# Patient Record
Sex: MALE | Race: OTHER | NOT HISPANIC OR LATINO | URBAN - METROPOLITAN AREA
[De-identification: names, ages, dates, MRNs, and addresses within clinical notes are randomized per-mention and may not be internally consistent; named-entity substitution may affect disease eponyms.]

---

## 2017-10-26 ENCOUNTER — EMERGENCY (EMERGENCY)
Facility: HOSPITAL | Age: 27
LOS: 1 days | Discharge: HOME | End: 2017-10-26
Attending: EMERGENCY MEDICINE
Payer: COMMERCIAL

## 2017-10-26 VITALS
SYSTOLIC BLOOD PRESSURE: 120 MMHG | DIASTOLIC BLOOD PRESSURE: 74 MMHG | RESPIRATION RATE: 16 BRPM | TEMPERATURE: 98 F | HEART RATE: 88 BPM | HEIGHT: 78 IN | WEIGHT: 225.09 LBS | OXYGEN SATURATION: 99 %

## 2017-10-26 DIAGNOSIS — Z98.89 OTHER SPECIFIED POSTPROCEDURAL STATES: Chronic | ICD-10-CM

## 2017-10-26 PROCEDURE — 73130 X-RAY EXAM OF HAND: CPT | Mod: 26,RT

## 2017-10-26 PROCEDURE — 99283 EMERGENCY DEPT VISIT LOW MDM: CPT | Mod: 25

## 2017-10-26 PROCEDURE — 73130 X-RAY EXAM OF HAND: CPT

## 2017-10-26 PROCEDURE — 99283 EMERGENCY DEPT VISIT LOW MDM: CPT

## 2017-10-26 RX ORDER — IBUPROFEN 200 MG
600 TABLET ORAL ONCE
Qty: 0 | Refills: 0 | Status: COMPLETED | OUTPATIENT
Start: 2017-10-26 | End: 2017-10-26

## 2017-10-26 RX ADMIN — Medication 600 MILLIGRAM(S): at 16:29
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
## 2017-10-26 NOTE — ED PROVIDER NOTE - PROGRESS NOTE DETAILS
Pt treated with pain medication and X-ray ordered. Pt treated with pain medication and X-ray ordered. No acute fracture/Normal imaging on ED read. Official Read and report pending Radiologist review in the Morning. Patient will be contacted if any additional findings  are made on official read.

## 2017-10-26 NOTE — ED PROVIDER NOTE - CARE PLAN
Principal Discharge DX:	Hand pain, right  Instructions for follow-up, activity and diet:	Continue with OTC pain medication and F/u with Department Physician

## 2017-10-26 NOTE — ED PROVIDER NOTE - PHYSICAL EXAMINATION
Right hand examination . No ecchymosis or contusion. Tenderness on palpation of hand on dorsal aspect. capillary refill <2sec.
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
## 2020-07-19 ENCOUNTER — EMERGENCY (EMERGENCY)
Facility: HOSPITAL | Age: 30
LOS: 1 days | Discharge: HOME | End: 2020-07-19
Attending: EMERGENCY MEDICINE
Payer: COMMERCIAL

## 2020-07-19 VITALS
OXYGEN SATURATION: 99 % | HEIGHT: 78 IN | RESPIRATION RATE: 20 BRPM | DIASTOLIC BLOOD PRESSURE: 95 MMHG | WEIGHT: 220.02 LBS | HEART RATE: 80 BPM | TEMPERATURE: 98 F | SYSTOLIC BLOOD PRESSURE: 150 MMHG

## 2020-07-19 DIAGNOSIS — Z98.89 OTHER SPECIFIED POSTPROCEDURAL STATES: Chronic | ICD-10-CM

## 2020-07-19 PROCEDURE — 96372 THER/PROPH/DIAG INJ SC/IM: CPT

## 2020-07-19 PROCEDURE — 99284 EMERGENCY DEPT VISIT MOD MDM: CPT

## 2020-07-19 PROCEDURE — 99053 MED SERV 10PM-8AM 24 HR FAC: CPT

## 2020-07-19 PROCEDURE — 99284 EMERGENCY DEPT VISIT MOD MDM: CPT | Mod: 25

## 2020-07-19 RX ORDER — METHOCARBAMOL 500 MG/1
1500 TABLET, FILM COATED ORAL ONCE
Refills: 0 | Status: COMPLETED | OUTPATIENT
Start: 2020-07-19 | End: 2020-07-19

## 2020-07-19 RX ORDER — LIDOCAINE 4 G/100G
1 CREAM TOPICAL ONCE
Refills: 0 | Status: COMPLETED | OUTPATIENT
Start: 2020-07-19 | End: 2020-07-19

## 2020-07-19 RX ORDER — IBUPROFEN 200 MG
1 TABLET ORAL
Qty: 30 | Refills: 0
Start: 2020-07-19 | End: 2020-07-28

## 2020-07-19 RX ORDER — METHOCARBAMOL 500 MG/1
2 TABLET, FILM COATED ORAL
Qty: 30 | Refills: 0
Start: 2020-07-19 | End: 2020-07-23

## 2020-07-19 RX ORDER — KETOROLAC TROMETHAMINE 30 MG/ML
30 SYRINGE (ML) INJECTION ONCE
Refills: 0 | Status: DISCONTINUED | OUTPATIENT
Start: 2020-07-19 | End: 2020-07-19

## 2020-07-19 RX ORDER — LIDOCAINE 4 G/100G
1 CREAM TOPICAL
Qty: 7 | Refills: 0
Start: 2020-07-19 | End: 2020-07-25

## 2020-07-19 RX ADMIN — METHOCARBAMOL 1500 MILLIGRAM(S): 500 TABLET, FILM COATED ORAL at 04:13

## 2020-07-19 RX ADMIN — Medication 30 MILLIGRAM(S): at 04:11

## 2020-07-19 RX ADMIN — LIDOCAINE 1 PATCH: 4 CREAM TOPICAL at 04:13

## 2020-07-19 NOTE — ED PROVIDER NOTE - MUSCULOSKELETAL, MLM
Spine appears normal, no midline pt vertebral or step offs. 5/5 muscle strength lower extremities range of motion is not limited. sensation intact . ambulatory steady gait
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
## 2020-07-19 NOTE — ED PROVIDER NOTE - PATIENT PORTAL LINK FT
You can access the FollowMyHealth Patient Portal offered by [facility] by registering at the following website: [email or web address]. By joining [facility]’s FollowMyHealth portal, you will also be able to view your health information using other applications (apps) compatible with our system.
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
## 2021-08-23 ENCOUNTER — EMERGENCY (EMERGENCY)
Facility: HOSPITAL | Age: 31
LOS: 1 days | Discharge: HOME | End: 2021-08-23
Attending: EMERGENCY MEDICINE
Payer: COMMERCIAL

## 2021-08-23 VITALS
SYSTOLIC BLOOD PRESSURE: 131 MMHG | HEIGHT: 78 IN | RESPIRATION RATE: 18 BRPM | OXYGEN SATURATION: 95 % | DIASTOLIC BLOOD PRESSURE: 81 MMHG | HEART RATE: 87 BPM | TEMPERATURE: 98 F

## 2021-08-23 VITALS
SYSTOLIC BLOOD PRESSURE: 129 MMHG | DIASTOLIC BLOOD PRESSURE: 79 MMHG | OXYGEN SATURATION: 99 % | TEMPERATURE: 97 F | HEART RATE: 81 BPM | RESPIRATION RATE: 19 BRPM

## 2021-08-23 DIAGNOSIS — Z98.89 OTHER SPECIFIED POSTPROCEDURAL STATES: Chronic | ICD-10-CM

## 2021-08-23 LAB
BASE EXCESS BLDV CALC-SCNC: 1.6 MMOL/L — SIGNIFICANT CHANGE UP (ref -2–3)
BLOOD GAS SOURCE: SIGNIFICANT CHANGE UP
CA-I SERPL-SCNC: 1.05 MMOL/L — LOW (ref 1.15–1.33)
CHLORIDE BLDV-SCNC: 103 MMOL/L — SIGNIFICANT CHANGE UP (ref 98–107)
COHGB MFR BLDV: 5.4 % — HIGH
GAS PNL BLDV: 134 MMOL/L — LOW (ref 136–145)
GAS PNL BLDV: SIGNIFICANT CHANGE UP
GLUCOSE BLDV-MCNC: 91 MG/DL — SIGNIFICANT CHANGE UP (ref 70–99)
HCO3 BLDV-SCNC: 26 MMOL/L — SIGNIFICANT CHANGE UP (ref 22–29)
HCT VFR BLDA CALC: 52 % — HIGH (ref 39–51)
HGB BLD CALC-MCNC: 17.2 G/DL — SIGNIFICANT CHANGE UP (ref 12.6–17.4)
HGB BLD CALC-MCNC: 17.5 G/DL — HIGH (ref 12.6–17.4)
LACTATE BLDV-MCNC: 1.4 MMOL/L — SIGNIFICANT CHANGE UP (ref 0.5–2)
PCO2 BLDV: 38 MMHG — LOW (ref 42–55)
PH BLDV: 7.44 — HIGH (ref 7.32–7.43)
PO2 BLDV: 192 MMHG — HIGH (ref 25–45)
POTASSIUM BLDV-SCNC: 3.5 MMOL/L — SIGNIFICANT CHANGE UP (ref 3.5–5.1)
SAO2 % BLDV: 100 % — SIGNIFICANT CHANGE UP

## 2021-08-23 PROCEDURE — 82947 ASSAY GLUCOSE BLOOD QUANT: CPT

## 2021-08-23 PROCEDURE — 84295 ASSAY OF SERUM SODIUM: CPT

## 2021-08-23 PROCEDURE — 99053 MED SERV 10PM-8AM 24 HR FAC: CPT

## 2021-08-23 PROCEDURE — 82375 ASSAY CARBOXYHB QUANT: CPT

## 2021-08-23 PROCEDURE — 83605 ASSAY OF LACTIC ACID: CPT

## 2021-08-23 PROCEDURE — 99283 EMERGENCY DEPT VISIT LOW MDM: CPT

## 2021-08-23 PROCEDURE — 99284 EMERGENCY DEPT VISIT MOD MDM: CPT

## 2021-08-23 PROCEDURE — 82803 BLOOD GASES ANY COMBINATION: CPT

## 2021-08-23 PROCEDURE — 85014 HEMATOCRIT: CPT

## 2021-08-23 PROCEDURE — 85018 HEMOGLOBIN: CPT

## 2021-08-23 PROCEDURE — 84132 ASSAY OF SERUM POTASSIUM: CPT

## 2021-08-23 PROCEDURE — 82435 ASSAY OF BLOOD CHLORIDE: CPT

## 2021-08-23 PROCEDURE — 82330 ASSAY OF CALCIUM: CPT
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
## 2021-08-23 NOTE — ED PROVIDER NOTE - ATTENDING CONTRIBUTION TO CARE
Yoon: I performed a face to face bedside interview with patient regarding history of present illness, review of symptoms and past medical history. I completed an independent physical exam and ordered tests/medications as needed.  I have discussed patient's plan of care with advanced care provider. The advanced care provider assisted in  executing the discussed plan. I was available for any questions or issues that may have arose during the execution of the plan of care.
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
## 2021-08-23 NOTE — ED PROVIDER NOTE - PHYSICAL EXAMINATION
Gen: Well appearing in NAD  Head: NC/AT  Neck: trachea midline, no stridor  Mouth: soot around mouth; no uvula edema, no oropharyngeal edema or erythema   Cardiac: RRR  Resp:  No distress; CTAB  Abd: Soft, NT  Ext: no deformities  Neuro:  A&O appears non focal  Skin:  Warm and dry as visualized  Psych:  Normal affect and mood

## 2021-08-23 NOTE — ED PROVIDER NOTE - PATIENT PORTAL LINK FT
You can access the FollowMyHealth Patient Portal offered by [facility] by registering at the following website: [email or web address]. By joining [employer]’s FollowMyHealth portal, you will also be able to view your health information using other applications (apps) compatible with our system.
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
## 2022-05-15 ENCOUNTER — EMERGENCY (EMERGENCY)
Facility: HOSPITAL | Age: 32
LOS: 1 days | Discharge: HOME | End: 2022-05-15
Attending: EMERGENCY MEDICINE
Payer: COMMERCIAL

## 2022-05-15 VITALS
OXYGEN SATURATION: 98 % | WEIGHT: 229.94 LBS | SYSTOLIC BLOOD PRESSURE: 125 MMHG | HEIGHT: 78 IN | DIASTOLIC BLOOD PRESSURE: 78 MMHG | HEART RATE: 80 BPM | RESPIRATION RATE: 18 BRPM | TEMPERATURE: 98 F

## 2022-05-15 DIAGNOSIS — Z98.89 OTHER SPECIFIED POSTPROCEDURAL STATES: Chronic | ICD-10-CM

## 2022-05-15 PROCEDURE — 99282 EMERGENCY DEPT VISIT SF MDM: CPT
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
## 2022-05-15 NOTE — ED PROVIDER NOTE - PATIENT PORTAL LINK FT
You can access the FollowMyHealth Patient Portal offered by [facility] by registering at the following website: [email or web address]. By joining [employer]’s FollowMyHealth portal, you will also be able to view your health information using other applications (apps) compatible with our system.

## 2022-07-01 NOTE — ED ADULT TRIAGE NOTE - MODE OF ARRIVAL
POST COLONOSCOPY CARE INSTRUCTIONS  ?   You may experience abdominal bloating or cramps due to air used to inflate the colon during the procedure. This is common and can be relieved by walking, lying on your left side with knees bent, and passing gas. A small amount of rectal bleeding may occur, especially if polyps or biopsies were removed. Do not use laxatives or enemas for one week following your procedure.     Follow Up Plan:   Resume your regular diet, as tolerated. If nauseated, only drink clear liquids until the nausea goes away.   You had 1 polyp taken today, several days are needed for the specimens to be analyzed. Dr. Ferreira will review the results with you when they are ready at your follow up appt in 4 weeks    Due to anesthesia/sedation you received, you may not remember the procedure or the doctor's explanation afterward.     Anesthesia/sedation sometimes cause dizziness, drowsiness and impaired judgment.   Therefore, please follow these recommendations for the next 24 hours.   Do not drive a car or operate any machinery.  Do not make critical decisions or sign any legal documents.   Do not drink alcohol.  Do not return to work, or perform any tasks that require coordinated activity.    If any of the following problems occur, notify the doctor who did your procedure or come to the emergency department. Emergency Room number [phone].  Severe pain/cramping in abdomen   Persistent Nausea and/or vomiting lasting more than a few hours  Temperature above 101 degrees F or redness, swelling, warmth or drainage at IV site may indicate infection  New/ increased bleeding from mouth or rectum, or black, tarry stools seek emergency care  Abdominal bloating not relieved by belching or passing gas   Chest pain or shortness of breath     Fall Prevention at Home  Falls happen at home for many reasons. Several known factors add to your risk for falling. These include:  Poor vision or hearing  History of falls  Use  of aids, such a cane  Poor nutrition  Certain medication  Being over 65 years old  Conditions in the home, such as slippery floors, loose rugs, cords on floor    Our goal is to help you stay safe at home and prevent falls. Here are some things that you can do that will help you lower your risk for falls at home:    Bathroom  Use a raised toilet seat and safety frame for ease in getting up and down from toilet  Set water temperator at 120 degrees or less (prevent burns and falls trying to avoid burns)  Consider a hand-held shower head, shower chair and handrails in the tub  Use liquid soap or soap on a rope to prevent dropping soap    Lighting  Replace dim, burned out or glaring lights with bright, soft white light bulbs  Use a night light  Make sure lights are easy turn on and off  Keep a flashlight available    Clear Hallways and Stairs  Remove clutter, especially from hallways and stairwells  Use handrails while taking the stairs  Place non-skid treads or bright reflective tape to mak the edge of stairs    Floors  Remove scatter/throw rugs  Place non-skid treads or double-sided tape under area rugs  Keep floor free from clutter  Wipe up spills immediately  Make sure floors are not slippery    Other  Store frequently used items at waist level  Select furniture with armrests for support in getting up and down  Keep phone within easy reach  Prevent dizziness and weakness from poor nutrition or medication change, consult your doctor or dietician           At [facility] we are committed to providing you with excellent service and are pleased that you and your physician have selected us to provide your medical care. Your satisfaction is very important, as we strive to achieve excellence in every aspect of your stay with us.         If you have any comments you would like to share, you may contact the Endoscopy charge nurse at [phone] between 7:00AM and 3:30PM or you may contact Genie Osborne  Manager of Digestive Health at [phone].       Walk in

## 2023-09-27 ENCOUNTER — NON-APPOINTMENT (OUTPATIENT)
Age: 33
End: 2023-09-27
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
## 2024-11-20 NOTE — ED PROVIDER NOTE - NSCAREINITIATED _GEN_ER
Recent potassium at cardiology office showed potassium of 3.3 was started on daily dose with request for repeat potassium at primary's.  This was drawn today results will be available and discussed within the next 24 to 48 hours   
aLurie Mendosa(PA)

## 2024-12-07 ENCOUNTER — EMERGENCY (EMERGENCY)
Facility: HOSPITAL | Age: 34
LOS: 1 days | Discharge: HOME | End: 2024-12-07
Attending: EMERGENCY MEDICINE
Payer: COMMERCIAL

## 2024-12-07 VITALS
SYSTOLIC BLOOD PRESSURE: 145 MMHG | DIASTOLIC BLOOD PRESSURE: 87 MMHG | OXYGEN SATURATION: 98 % | HEART RATE: 76 BPM | TEMPERATURE: 98 F | RESPIRATION RATE: 18 BRPM

## 2024-12-07 VITALS
OXYGEN SATURATION: 98 % | DIASTOLIC BLOOD PRESSURE: 100 MMHG | TEMPERATURE: 98 F | SYSTOLIC BLOOD PRESSURE: 158 MMHG | RESPIRATION RATE: 18 BRPM | HEART RATE: 76 BPM | WEIGHT: 187.39 LBS

## 2024-12-07 DIAGNOSIS — Z98.89 OTHER SPECIFIED POSTPROCEDURAL STATES: Chronic | ICD-10-CM

## 2024-12-07 LAB
ALBUMIN SERPL ELPH-MCNC: 4.4 G/DL — SIGNIFICANT CHANGE UP (ref 3.3–5.2)
ALP SERPL-CCNC: 53 U/L — SIGNIFICANT CHANGE UP (ref 40–120)
ALT FLD-CCNC: 23 U/L — SIGNIFICANT CHANGE UP
ANION GAP SERPL CALC-SCNC: 11 MMOL/L — SIGNIFICANT CHANGE UP (ref 5–17)
AST SERPL-CCNC: 27 U/L — SIGNIFICANT CHANGE UP
BASOPHILS # BLD AUTO: 0.04 K/UL — SIGNIFICANT CHANGE UP (ref 0–0.2)
BASOPHILS NFR BLD AUTO: 0.7 % — SIGNIFICANT CHANGE UP (ref 0–2)
BILIRUB SERPL-MCNC: 0.9 MG/DL — SIGNIFICANT CHANGE UP (ref 0.4–2)
BLOOD GAS SOURCE: SIGNIFICANT CHANGE UP
BUN SERPL-MCNC: 14.7 MG/DL — SIGNIFICANT CHANGE UP (ref 8–20)
CALCIUM SERPL-MCNC: 9.2 MG/DL — SIGNIFICANT CHANGE UP (ref 8.4–10.5)
CHLORIDE SERPL-SCNC: 101 MMOL/L — SIGNIFICANT CHANGE UP (ref 96–108)
CO2 SERPL-SCNC: 24 MMOL/L — SIGNIFICANT CHANGE UP (ref 22–29)
COHGB MFR BLDV: 2.2 % — SIGNIFICANT CHANGE UP
CREAT SERPL-MCNC: 0.85 MG/DL — SIGNIFICANT CHANGE UP (ref 0.5–1.3)
EGFR: 117 ML/MIN/1.73M2 — SIGNIFICANT CHANGE UP
EOSINOPHIL # BLD AUTO: 0.08 K/UL — SIGNIFICANT CHANGE UP (ref 0–0.5)
EOSINOPHIL NFR BLD AUTO: 1.5 % — SIGNIFICANT CHANGE UP (ref 0–6)
GLUCOSE SERPL-MCNC: 94 MG/DL — SIGNIFICANT CHANGE UP (ref 70–99)
HCT VFR BLD CALC: 44.8 % — SIGNIFICANT CHANGE UP (ref 39–50)
HGB BLD CALC-MCNC: 16.3 G/DL — SIGNIFICANT CHANGE UP (ref 12.6–17.4)
HGB BLD-MCNC: 15.7 G/DL — SIGNIFICANT CHANGE UP (ref 13–17)
IMM GRANULOCYTES NFR BLD AUTO: 0.2 % — SIGNIFICANT CHANGE UP (ref 0–0.9)
LYMPHOCYTES # BLD AUTO: 1.57 K/UL — SIGNIFICANT CHANGE UP (ref 1–3.3)
LYMPHOCYTES # BLD AUTO: 28.5 % — SIGNIFICANT CHANGE UP (ref 13–44)
MCHC RBC-ENTMCNC: 32.4 PG — SIGNIFICANT CHANGE UP (ref 27–34)
MCHC RBC-ENTMCNC: 35 G/DL — SIGNIFICANT CHANGE UP (ref 32–36)
MCV RBC AUTO: 92.4 FL — SIGNIFICANT CHANGE UP (ref 80–100)
MONOCYTES # BLD AUTO: 0.57 K/UL — SIGNIFICANT CHANGE UP (ref 0–0.9)
MONOCYTES NFR BLD AUTO: 10.4 % — SIGNIFICANT CHANGE UP (ref 2–14)
NEUTROPHILS # BLD AUTO: 3.23 K/UL — SIGNIFICANT CHANGE UP (ref 1.8–7.4)
NEUTROPHILS NFR BLD AUTO: 58.7 % — SIGNIFICANT CHANGE UP (ref 43–77)
PLATELET # BLD AUTO: 180 K/UL — SIGNIFICANT CHANGE UP (ref 150–400)
POTASSIUM SERPL-MCNC: 3.9 MMOL/L — SIGNIFICANT CHANGE UP (ref 3.5–5.3)
POTASSIUM SERPL-SCNC: 3.9 MMOL/L — SIGNIFICANT CHANGE UP (ref 3.5–5.3)
PROT SERPL-MCNC: 7.1 G/DL — SIGNIFICANT CHANGE UP (ref 6.6–8.7)
RBC # BLD: 4.85 M/UL — SIGNIFICANT CHANGE UP (ref 4.2–5.8)
RBC # FLD: 12 % — SIGNIFICANT CHANGE UP (ref 10.3–14.5)
SODIUM SERPL-SCNC: 136 MMOL/L — SIGNIFICANT CHANGE UP (ref 135–145)
WBC # BLD: 5.5 K/UL — SIGNIFICANT CHANGE UP (ref 3.8–10.5)
WBC # FLD AUTO: 5.5 K/UL — SIGNIFICANT CHANGE UP (ref 3.8–10.5)

## 2024-12-07 PROCEDURE — 80053 COMPREHEN METABOLIC PANEL: CPT

## 2024-12-07 PROCEDURE — 93005 ELECTROCARDIOGRAM TRACING: CPT

## 2024-12-07 PROCEDURE — 71045 X-RAY EXAM CHEST 1 VIEW: CPT

## 2024-12-07 PROCEDURE — 99284 EMERGENCY DEPT VISIT MOD MDM: CPT

## 2024-12-07 PROCEDURE — 71045 X-RAY EXAM CHEST 1 VIEW: CPT | Mod: 26

## 2024-12-07 PROCEDURE — 36415 COLL VENOUS BLD VENIPUNCTURE: CPT

## 2024-12-07 PROCEDURE — 93010 ELECTROCARDIOGRAM REPORT: CPT

## 2024-12-07 PROCEDURE — 99285 EMERGENCY DEPT VISIT HI MDM: CPT | Mod: 25

## 2024-12-07 PROCEDURE — 85025 COMPLETE CBC W/AUTO DIFF WBC: CPT

## 2024-12-07 PROCEDURE — 82375 ASSAY CARBOXYHB QUANT: CPT

## 2024-12-07 PROCEDURE — 99053 MED SERV 10PM-8AM 24 HR FAC: CPT

## 2024-12-07 NOTE — ED PROVIDER NOTE - NSFOLLOWUPINSTRUCTIONS_ED_ALL_ED_FT
You have decided to leave against medical advice.  We discussed all risks, benefits, and alternatives to the progression of treatment and the potential dangers of leaving including but not limited to permanent disability, injury, and death.  You are welcome to change your decision to leave against medical advice and return to the emergency department at any time and for any reason in order to allow us to render care.    Smoke Inhalation    WHAT YOU NEED TO KNOW:    What is smoke inhalation? Smoke inhalation means you breathed in smoke from burning materials and gases. The smoke may contain chemicals or poisons, such as carbon monoxide and cyanide. The harmful chemicals may come from burning rubber, coal, plastic, or electrical wiring.    What are the signs and symptoms of smoke inhalation? Signs and symptoms depend on the source of the smoke and how long you were exposed:    Cough and hoarseness    Chest pain or coughing up blood    Trouble breathing, such as shortness of breath and noisy breathing    Headache, abdominal pain, and nausea    Eye irritation or vision problems    Fainting    Soot in your nostrils or throat  How is smoke inhalation diagnosed? Healthcare providers will ask you about the source of the smoke that you inhaled. They will also ask about the amount of time that you were exposed to the smoke. You may need any of the following:    Blood tests are used to check the amounts of oxygen and carbon dioxide. The results can tell healthcare providers how well your lungs are working.    A bronchoscopy is a procedure to look inside your airway. A bronchoscope (thin tube with a light) is inserted into your mouth and moved down your throat to your airway. Tissue and fluid may be collected to be tested.    X-rays may show lung damage or signs of infection, such as pneumonia.    Pulmonary function tests help healthcare providers learn how well your body uses oxygen. You breathe into a mouthpiece connected to a machine. The machine measures how much air you breathe in and out over a certain amount of time.    A V/Q scan is used to check your lung function. Radioactive dye will be put into your vein. Your blood will carry the dye to the blood vessels in your lungs. Pictures will be taken to see how blood flows in your lungs. Then you will breathe in a gas. Pictures will be taken to see how well your lungs take in oxygen.  How is smoke inhalation treated? Treatment depends on how the smoke affected you. Your lungs and airway may become irritated, swollen, and blocked. The damaged airway and lungs prevent oxygen from getting into your blood. You may develop respiratory failure. Respiratory failure means you cannot breathe well enough to get oxygen to the cells of your body. You may need any of the following:    Medicines may be given to open your air passages so you can breathe more easily. You may also need medicines to prevent or treat pain or a bacterial infection.    Antidotes may be given to stop or control the effects of the smoke you inhaled. The antidotes used will depend on the type of smoke you inhaled.    Hyperbaric oxygen therapy is used to get more oxygen into your body. The oxygen is given under pressure to help it get into your tissues and blood.  Hyperbaric Oxygen Therapy Monoplace Chamber      Extra oxygen may be needed if your blood oxygen level is lower than it should be.    A ventilator is a machine that gives you oxygen and breathes for you when you cannot breathe well on your own.  How can smoke inhalation be prevented?    Make sure electrical wiring, chimneys, wood stoves, and space heaters are working properly. Use flammable liquids safely. Store them in a locked area out of the reach of children. Use a childproof lock or latch so children cannot get to the liquids.  Common Childproofing Latches       Do not leave a lit cigarette. Discard cigarettes properly. Keep lighters and matches in a safe place where children cannot reach them.    Make an escape plan in case a fire breaks out in your home. Practice it often with your family. Crawl on the floor to escape a burning building. The air will be cooler and .  Home Fire Safety       Use smoke detectors in your home. Put 1 detector on each level of your home and outside each bedroom. Check the smoke detectors regularly to make sure they are working. Change the batteries at least 1 time each year.    If you need to escape a burning building, crawl on the floor. The air will be cooler and less filled with smoke.  Call your local emergency number (911 in the [place]) if:    You cough up or vomit blood.    You have a fast heartbeat and chest pain.    You have increased shortness of breath.  When should I seek immediate care?    You feel weak.    You have pale and clammy skin.    You are wheezing.    Your lips or fingernails turn blue.  When should I call my doctor?    You have a fever.    You have questions or concerns about your condition or care.
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
## 2024-12-07 NOTE — ED PROVIDER NOTE - NS ED ROS FT
Constitutional: (-) fever  (-)chills  (-)sweats  Eyes/ENT: (-)   Cardiovascular: (-) chest pain, (-) palpitations (-) edema   Respiratory: (-) cough, (+) shortness of breath   Gastrointestinal: (+)nausea  (-)vomiting, (-) diarrhea  (-) abdominal pain   :  (-)dysuria, (-)frequency, (-)urgency, (-)hematuria  Musculoskeletal: (-) neck pain, (-) back pain, (-) joint pain  Integumentary: (-) rash, (-) edema  Neurological: (-) headache, (-) altered mental status  (-)LOC

## 2024-12-07 NOTE — ED PROVIDER NOTE - PATIENT PORTAL LINK FT
You can access the FollowMyHealth Patient Portal offered by [facility] by registering at the following website: [email or web address]. By joining [employer]’s FollowMyHealth portal, you will also be able to view your health information using other applications (apps) compatible with our system.

## 2024-12-07 NOTE — ED PROVIDER NOTE - PHYSICAL EXAMINATION
Gen: Alert, NAD  Head: NC, AT, PERRL, EOMI, normal lids/conjunctiva  ENT: singed hairs of nares, no soot in posterior oropharynx  Neck: +supple, no tenderness/meningismus/JVD, +Trachea midline  Pulm: Bilateral BS, normal resp effort, no wheeze/stridor/retractions  CV: tachycardic, no M/R/G, 2+dist pulses  Abd: soft, NT/ND, +BS, no hepatosplenomegaly  Mskel: ROM intact x4 extremities.  no edema/erythema/cyanosis  Skin: soot over face  Neuro: AAOx3, no sensory/motor deficits, CN 2-12 intact
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
